# Patient Record
Sex: FEMALE | Race: WHITE
[De-identification: names, ages, dates, MRNs, and addresses within clinical notes are randomized per-mention and may not be internally consistent; named-entity substitution may affect disease eponyms.]

---

## 2022-08-30 ENCOUNTER — HOSPITAL ENCOUNTER (OUTPATIENT)
Dept: HOSPITAL 33 - ED | Age: 62
Setting detail: OBSERVATION
Discharge: HOME | End: 2022-08-30
Attending: INTERNAL MEDICINE | Admitting: INTERNAL MEDICINE
Payer: MEDICARE

## 2022-08-30 VITALS — DIASTOLIC BLOOD PRESSURE: 75 MMHG | OXYGEN SATURATION: 91 % | SYSTOLIC BLOOD PRESSURE: 122 MMHG | HEART RATE: 113 BPM

## 2022-08-30 DIAGNOSIS — S00.83XA: ICD-10-CM

## 2022-08-30 DIAGNOSIS — S00.432A: ICD-10-CM

## 2022-08-30 DIAGNOSIS — S05.12XA: ICD-10-CM

## 2022-08-30 DIAGNOSIS — Z20.828: ICD-10-CM

## 2022-08-30 DIAGNOSIS — Z79.899: ICD-10-CM

## 2022-08-30 DIAGNOSIS — I48.91: ICD-10-CM

## 2022-08-30 DIAGNOSIS — I50.9: ICD-10-CM

## 2022-08-30 DIAGNOSIS — I21.4: Primary | ICD-10-CM

## 2022-08-30 DIAGNOSIS — N39.0: ICD-10-CM

## 2022-08-30 DIAGNOSIS — S40.022A: ICD-10-CM

## 2022-08-30 DIAGNOSIS — Z85.3: ICD-10-CM

## 2022-08-30 DIAGNOSIS — T74.91XA: ICD-10-CM

## 2022-08-30 LAB
ALBUMIN SERPL-MCNC: 3.9 G/DL (ref 3.5–5)
ALP SERPL-CCNC: 104 U/L (ref 38–126)
ALT SERPL-CCNC: 24 U/L (ref 0–35)
AMPHETAMINES UR QL: NEGATIVE
AMYLASE SERPL-CCNC: 67 U/L (ref 30–110)
ANION GAP SERPL CALC-SCNC: 16.2 MEQ/L (ref 5–15)
ANION GAP SERPL CALC-SCNC: 9.7 MEQ/L (ref 5–15)
AST SERPL QL: 31 U/L (ref 14–36)
BARBITURATES UR QL: NEGATIVE
BASOPHILS # BLD AUTO: 0.09 X10^3/UL (ref 0–0.4)
BENZODIAZ UR QL SCN: NEGATIVE
BILIRUB BLD-MCNC: 0.7 MG/DL (ref 0.2–1.3)
BNP SERPL-MCNC: 3800 PG/ML (ref 0–900)
BUN SERPL-MCNC: 17 MG/DL (ref 7–17)
BUN SERPL-MCNC: 19 MG/DL (ref 7–17)
CALCIUM SPEC-MCNC: 8.7 MG/DL (ref 8.4–10.2)
CALCIUM SPEC-MCNC: 8.8 MG/DL (ref 8.4–10.2)
CHLORIDE SERPL-SCNC: 102 MMOL/L (ref 98–107)
CHLORIDE SERPL-SCNC: 107 MMOL/L (ref 98–107)
CO2 SERPL-SCNC: 21 MMOL/L (ref 22–30)
CO2 SERPL-SCNC: 22 MMOL/L (ref 22–30)
COCAINE UR QL SCN: NEGATIVE
CREAT SERPL-MCNC: 0.75 MG/DL (ref 0.52–1.04)
CREAT SERPL-MCNC: 0.95 MG/DL (ref 0.52–1.04)
CRYSTALS UNIDENTIFIED: (no result) /HPF
EOSINOPHIL # BLD AUTO: 0.03 X10^3/UL (ref 0–0.5)
FLUAV AG NPH QL IA: NEGATIVE
FLUBV AG NPH QL IA: NEGATIVE
GFR SERPLBLD BASED ON 1.73 SQ M-ARVRAT: > 60 ML/MIN
GFR SERPLBLD BASED ON 1.73 SQ M-ARVRAT: > 60 ML/MIN
GLUCOSE SERPL-MCNC: 131 MG/DL (ref 74–106)
GLUCOSE SERPL-MCNC: 178 MG/DL (ref 74–106)
GLUCOSE UR-MCNC: NEGATIVE MG/DL
HCT VFR BLD AUTO: 38.7 % (ref 35–47)
HCT VFR BLD AUTO: 39.3 % (ref 35–47)
HGB BLD-MCNC: 12.7 G/DL (ref 12–16)
HGB BLD-MCNC: 13.1 G/DL (ref 12–16)
LIPASE SERPL-CCNC: 36 U/L (ref 23–300)
LYMPHOCYTES # SPEC AUTO: 1.34 X10^3/UL (ref 1–4.6)
MCH RBC QN AUTO: 27.3 PG (ref 26–32)
MCH RBC QN AUTO: 27.5 PG (ref 26–32)
MCHC RBC AUTO-ENTMCNC: 32.8 G/DL (ref 32–36)
MCHC RBC AUTO-ENTMCNC: 33.3 G/DL (ref 32–36)
METHADONE UR QL: NEGATIVE
MONOCYTES # BLD AUTO: 0.57 X10^3/UL (ref 0–1.3)
OPIATES UR QL: NEGATIVE
PCP UR QL CFM>20 NG/ML: NEGATIVE
PLATELET # BLD AUTO: 262 X10^3/UL (ref 150–450)
PLATELET # BLD AUTO: 284 X10^3/UL (ref 150–450)
POTASSIUM SERPLBLD-SCNC: 3.5 MMOL/L (ref 3.5–5.1)
POTASSIUM SERPLBLD-SCNC: 3.8 MMOL/L (ref 3.5–5.1)
PROT SERPL-MCNC: 6.6 G/DL (ref 6.3–8.2)
PROT UR STRIP-MCNC: 30 MG/DL
RBC # BLD AUTO: 4.62 X10^6/UL (ref 4.1–5.4)
RBC # BLD AUTO: 4.8 X10^6/UL (ref 4.1–5.4)
RBC # UR AUTO: (no result) ERY/UL (ref 0–5)
RSV AG SPEC QL IA: NEGATIVE
SARS-COV-2 AG RESP QL IA.RAPID: NEGATIVE
SODIUM SERPL-SCNC: 135 MMOL/L (ref 137–145)
SODIUM SERPL-SCNC: 136 MMOL/L (ref 137–145)
THC UR QL SCN: POSITIVE
TROPONIN T SERPL HS-MCNC: 0.05 NG/ML (ref 0–0.03)
UA DIPSTICK PNL UR: (no result)
URINE CULTURED INDICATED?: YES
WBC # BLD AUTO: 14.2 X10^3/UL (ref 4–10.5)
WBC # BLD AUTO: 16.3 X10^3/UL (ref 4–10.5)
WBC #/AREA URNS HPF: (no result) /HPF (ref 0–5)

## 2022-08-30 PROCEDURE — 96365 THER/PROPH/DIAG IV INF INIT: CPT

## 2022-08-30 PROCEDURE — 80053 COMPREHEN METABOLIC PANEL: CPT

## 2022-08-30 PROCEDURE — 83880 ASSAY OF NATRIURETIC PEPTIDE: CPT

## 2022-08-30 PROCEDURE — G0378 HOSPITAL OBSERVATION PER HR: HCPCS

## 2022-08-30 PROCEDURE — 84484 ASSAY OF TROPONIN QUANT: CPT

## 2022-08-30 PROCEDURE — 85027 COMPLETE CBC AUTOMATED: CPT

## 2022-08-30 PROCEDURE — 74177 CT ABD & PELVIS W/CONTRAST: CPT

## 2022-08-30 PROCEDURE — 71260 CT THORAX DX C+: CPT

## 2022-08-30 PROCEDURE — 96376 TX/PRO/DX INJ SAME DRUG ADON: CPT

## 2022-08-30 PROCEDURE — 80048 BASIC METABOLIC PNL TOTAL CA: CPT

## 2022-08-30 PROCEDURE — 96375 TX/PRO/DX INJ NEW DRUG ADDON: CPT

## 2022-08-30 PROCEDURE — 99292 CRITICAL CARE ADDL 30 MIN: CPT

## 2022-08-30 PROCEDURE — 85730 THROMBOPLASTIN TIME PARTIAL: CPT

## 2022-08-30 PROCEDURE — 0241U: CPT

## 2022-08-30 PROCEDURE — 81015 MICROSCOPIC EXAM OF URINE: CPT

## 2022-08-30 PROCEDURE — 83690 ASSAY OF LIPASE: CPT

## 2022-08-30 PROCEDURE — 87086 URINE CULTURE/COLONY COUNT: CPT

## 2022-08-30 PROCEDURE — 85025 COMPLETE CBC W/AUTO DIFF WBC: CPT

## 2022-08-30 PROCEDURE — 73130 X-RAY EXAM OF HAND: CPT

## 2022-08-30 PROCEDURE — 82150 ASSAY OF AMYLASE: CPT

## 2022-08-30 PROCEDURE — 96367 TX/PROPH/DG ADDL SEQ IV INF: CPT

## 2022-08-30 PROCEDURE — 93268 ECG RECORD/REVIEW: CPT

## 2022-08-30 PROCEDURE — 73060 X-RAY EXAM OF HUMERUS: CPT

## 2022-08-30 PROCEDURE — 99285 EMERGENCY DEPT VISIT HI MDM: CPT

## 2022-08-30 PROCEDURE — 70486 CT MAXILLOFACIAL W/O DYE: CPT

## 2022-08-30 PROCEDURE — 96374 THER/PROPH/DIAG INJ IV PUSH: CPT

## 2022-08-30 PROCEDURE — 36415 COLL VENOUS BLD VENIPUNCTURE: CPT

## 2022-08-30 PROCEDURE — 99291 CRITICAL CARE FIRST HOUR: CPT

## 2022-08-30 PROCEDURE — 70450 CT HEAD/BRAIN W/O DYE: CPT

## 2022-08-30 PROCEDURE — 93005 ELECTROCARDIOGRAM TRACING: CPT

## 2022-08-30 PROCEDURE — G0480 DRUG TEST DEF 1-7 CLASSES: HCPCS

## 2022-08-30 PROCEDURE — 36000 PLACE NEEDLE IN VEIN: CPT

## 2022-08-30 PROCEDURE — 80307 DRUG TEST PRSMV CHEM ANLYZR: CPT

## 2022-08-30 PROCEDURE — 73080 X-RAY EXAM OF ELBOW: CPT

## 2022-08-30 PROCEDURE — 72125 CT NECK SPINE W/O DYE: CPT

## 2022-08-30 RX ADMIN — HEPARIN SODIUM ONE UNIT: 10000 INJECTION, SOLUTION INTRAVENOUS; SUBCUTANEOUS at 15:17

## 2022-08-30 RX ADMIN — HEPARIN SODIUM ONE UNIT: 10000 INJECTION, SOLUTION INTRAVENOUS; SUBCUTANEOUS at 15:14
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
## 2022-08-31 NOTE — PCM.DS
Discharge Summary


Date of Admission: 


08/30/22 18:10





Admitting Physician: 


DARCI COLLINS





Consults: 





                                Consults on Case





08/30/22 17:33


Nutritional Consult ROUTINE 











Primary Care Provider: 


JOSE MCKNIGHT








Allergies


Allergies





No Known Drug Allergies Allergy (Verified 08/30/22 18:20)


   











Hospital Summary





- Hospital Course


Hospital Course: 








                                 Chief Complaint





Diagnosis                        CHF





                                    Allergies











Allergy/AdvReac Type Severity Reaction Status Date / Time


 


No Known Drug Allergies Allergy   Verified 08/30/22 18:20








                           Vital Signs (Last 24 hours)











  Temp Pulse Resp BP BP Pulse Ox


 


 08/30/22 22:00  98.4 F  113 H  22   122/75  91 L


 


 08/30/22 21:00   120 H  26 H   124/84  92 L


 


 08/30/22 20:00  98.3 F  122 H  26 H  97/74  97/74  94 L


 


 08/30/22 19:00   130 H  26 H  122/74  


 


 08/30/22 18:30   128 H  28 H  114/74  


 


 08/30/22 18:26  98.6 F  146 H  26 H   105/80  94 L


 


 08/30/22 17:40       91 L


 


 08/30/22 17:00   126 H  24   113/81  95


 


 08/30/22 16:00   99 H    120/89  93 L


 


 08/30/22 15:00   95 H  14   125/82  93 L


 


 08/30/22 14:00   129 H  18   105/73  94 L


 


 08/30/22 13:01   113 H  26 H   122/94  92 L


 


 08/30/22 12:06   113 H  21   115/81  92 L


 


 08/30/22 12:03   103 H  22  122/94  


 


 08/30/22 11:03   141 H  25 H  127/99  


 


 08/30/22 10:03  97.7 F     


 


 08/30/22 09:51    30 H    91 L


 


 08/30/22 09:48   124 H  28 H   137/116  91 L








                                Home Medications











 Medication  Instructions  Recorded  Confirmed  Last Taken  Type


 


Amlodipine Besylate 10 mg PO QHS 08/30/22 08/30/22 08/29/22 22:00 History


 


Atorvastatin Calcium 20 mg PO QHS 08/30/22 08/30/22 08/29/22 22:00 History


 


Levothyroxine Sodium 50 Mcg*** 50 mg PO QHS 08/30/22 08/30/22 08/29/22 22:00 

History





[Synthroid 50 Mcg***]     


 


Lisinopril/Hydrochlorothiazide 1 tab PO QHS 08/30/22 08/30/22 08/29/22 22:00 

History





[Lisinopril-Hctz 10-12.5 mg Tab]     








                               Current Medications














Discontinued Medications














Generic Name Dose Route Start Last Admin





  Trade Name Freq  PRN Reason Stop Dose Admin


 


Aspirin  324 mg  08/30/22 16:26  08/30/22 16:34





  Aspirin 81 Mg Tab.Chew  PO  08/30/22 16:27  324 mg





  STAT ONE   Administration


 


Diltiazem HCl  15 mg  08/30/22 11:01  08/30/22 11:03





  Diltiazem Hcl Iv 5 Mg/Ml Vial  IV  08/30/22 11:02  15 mg





  STAT ONE   Administration


 


Diltiazem HCl  Confirm  08/30/22 11:01 





  Diltiazem Hcl Iv 5 Mg/Ml Vial  Administered  08/30/22 11:02 





  Dose  





  50 mg  





  IV  





  .STK-MED ONE  


 


Enoxaparin Sodium  40 mg  08/30/22 22:00 





  Enoxaparin Sodium*** 40 Mg/0.4 Ml Syringe  SQ  09/29/22 21:59 





  DAILY DANICA  


 


Fentanyl Citrate  50 mcg  08/30/22 10:27  08/30/22 10:32





  Fentanyl Citrate 100 Mcg/2 Ml* Vial  IV  08/30/22 10:28  50 mcg





  STAT ONE   Administration


 


Fentanyl Citrate  Confirm  08/30/22 10:31 





  Fentanyl Citrate 100 Mcg/2 Ml* Vial  Administered  08/30/22 10:32 





  Dose  





  100 mcg  





  .ROUTE  





  .STK-MED ONE  


 


Furosemide  40 mg  08/30/22 12:31  08/30/22 12:51





  Furosemide 40 Mg/4 Ml Vial  IV  08/30/22 12:32  40 mg





  STAT ONE   Administration


 


Furosemide  Confirm  08/30/22 12:51 





  Furosemide 40 Mg/4 Ml Vial  Administered  08/30/22 12:52 





  Dose  





  40 mg  





  .ROUTE  





  .STK-MED ONE  


 


Furosemide  40 mg  08/31/22 10:00 





  Furosemide 40 Mg/4 Ml Vial  IV  09/30/22 09:59 





  BID DIURETIC DANICA  


 


Heparin Sodium (Beef Lung)  5,000 unit  08/30/22 15:11  08/30/22 15:17





  Heparin 5000 Unit/0.5 Ml Syringe  IV  08/30/22 15:12  5,000 unit





  STAT ONE   Administration


 


Heparin Sodium (Beef Lung)  Confirm  08/30/22 15:13 





  Heparin 5000 Unit/0.5 Ml Syringe  Administered  08/30/22 15:14 





  Dose  





  5,000 unit  





  .ROUTE  





  .STK-MED ONE  


 


Diltiazem HCl  100 mls @ 5 mls/hr  08/30/22 11:01  08/30/22 20:00





  Cardizem Drip 100 Mg/100 Ml D5w  IV  09/29/22 11:00  10 mg/hr





  .Q20H PRN   10 mls/hr





  HEART RATE/ A-FIB   Titration





  Protocol  





  5 MG/HR  


 


Heparin Sodium/Dextrose  25,000 units in 250 mls @ 10 mls/hr  08/30/22 15:30  

08/30/22 20:53





  Heparin 25,000 Units/D5w 250ml Premix  IV  09/29/22 15:29  0 mls/hr





  .Q24H DANICA   0 mls/hr





    Infusion


 


Ceftriaxone Sodium/Dextrose  1 g in 50 mls @ 100 mls/hr  08/30/22 15:15  

08/30/22 15:52





  Rocephin 1 Gm-D5w 50 Ml Bag**  IV  08/30/22 15:44  Infused





  STAT STA   Infusion


 


Ceftriaxone Sodium/Dextrose  Confirm  08/30/22 15:18 





  Rocephin 1 Gm-D5w 50 Ml Bag**  Administered  08/30/22 15:19 





  Dose  





  1 g in 50 mls @ ud  





  IV  





  .STK-MED ONE  


 


Ceftriaxone Sodium/Dextrose  1 g in 50 mls @ 100 mls/hr  08/31/22 10:00 





  Rocephin 1 Gm-D5w 50 Ml Bag**  IV  09/03/22 09:59 





  Q24H10 DANICA  


 


Heparin Sodium/Dextrose  Confirm  08/30/22 15:14 





  Heparin 25,000 Units/D5w 250ml Premix  Administered  08/30/22 15:15 





  Dose  





  25,000 units in 250 mls @ ud  





  IV  





  .STK-MED ONE  


 


Diltiazem HCl  Confirm  08/30/22 11:01 





  Cardizem Drip 100 Mg/100 Ml D5w  Administered  08/30/22 11:02 





  Dose  





  100 mls @ ud  





  IV  





  .STK-MED ONE  


 


Diltiazem HCl  Confirm  08/30/22 22:50 





  Cardizem Drip 100 Mg/100 Ml D5w  Administered  08/30/22 22:51 





  Dose  





  100 mls @ ud  





  IV  





  .STK-MED ONE  


 


Ondansetron HCl  4 mg  08/30/22 10:27  08/30/22 10:31





  Ondansetron Hcl 4 Mg/2 Ml Vial  IV  08/30/22 10:28  4 mg





  STAT ONE   Administration


 


Ondansetron HCl  Confirm  08/30/22 10:31 





  Ondansetron Hcl 4 Mg/2 Ml Vial  Administered  08/30/22 10:32 





  Dose  





  4 mg  





  .ROUTE  





  .STK-MED ONE  








                         Intake & Output (Last 24 hours)











 08/28/22 08/29/22 08/30/22 08/31/22





 11:59 11:59 11:59 11:59


 


Output Total    1000


 


Balance    -1000


 


Weight   98 kg 94.2 kg








                      Microbiology Results (Last 24 hours)





08/30/22 10:43   Clean Catch Midstream   Urine Culture - Pending





                       Laboratory Results (Last 24 hours)











  08/30/22 08/30/22 08/30/22





  20:15 20:13 17:50


 


WBC   


 


RBC   


 


Hgb   


 


Hct   


 


MCV   


 


MCH   


 


MCHC   


 


RDW   


 


Plt Count   


 


MPV   


 


Gran %   


 


Immature Gran % (Auto)   


 


Nucleat RBC Rel Count   


 


Eos # (Auto)   


 


Immature Gran # (Auto)   


 


Absolute Lymphs (auto)   


 


Absolute Monos (auto)   


 


Absolute Nucleated RBC   


 


Lymphocytes %   


 


Monocytes %   


 


Eosinophils %   


 


Basophils %   


 


Absolute Granulocytes   


 


Basophils #   


 


APTT   32.1 


 


Sodium    135 L


 


Potassium    3.8


 


Chloride    102


 


Carbon Dioxide    21 L


 


Anion Gap    16.2 H


 


BUN    17


 


Creatinine    0.95


 


Estimated GFR    > 60.0


 


Glucose    178 H


 


Calcium    8.7


 


Total Bilirubin   


 


AST   


 


ALT   


 


Alkaline Phosphatase   


 


Troponin I  0.049 H*   0.049 H*


 


NT-Pro-B Natriuret Pep    3800 H


 


Serum Total Protein   


 


Albumin   


 


Amylase   


 


Lipase   


 


Urinalys Dipstick Clnc   


 


Urine Color   


 


Urine Appearance   


 


Urine pH   


 


Ur Specific Gravity   


 


POC Urine Protein Conf   


 


Urine Ketones   


 


Urine Nitrite   


 


Urine Bilirubin   


 


Urine Urobilinogen   


 


Urine Leukocytes   


 


Urine WBC (Auto)   


 


Urine RBC (Auto)   


 


U Epithel Cells (Auto)   


 


Urine Bacteria (Auto)   


 


Urine RBC   


 


Unidentified Crystals   


 


Urine Mucus (Auto)   


 


Ur Yeast w Hyphae   


 


Urine Yeast (Budding)   


 


Ur Culture Indicated?   


 


Urine Glucose   


 


Urine Opiates Level   


 


Ur Methadone   


 


Urine Barbiturates   


 


Ur Phencyclidine (PCP)   


 


Urine Amphetamine   


 


U Benzodiazepine Level   


 


Urine Cocaine   


 


Urine Marijuana (THC)   


 


Ethyl Alcohol   


 


Influenza Type A Ag   


 


Influenza Type B Ag   


 


RSV (PCR)   


 


SARS-CoV-2 (PCR)   














  08/30/22 08/30/22 08/30/22





  17:50 14:30 12:30


 


WBC  16.3 H  


 


RBC  4.62  


 


Hgb  12.7  


 


Hct  38.7  


 


MCV  83.8  


 


MCH  27.5  


 


MCHC  32.8  


 


RDW  14.4 H  


 


Plt Count  284  


 


MPV  10.6  


 


Gran %   


 


Immature Gran % (Auto)   


 


Nucleat RBC Rel Count   


 


Eos # (Auto)   


 


Immature Gran # (Auto)   


 


Absolute Lymphs (auto)   


 


Absolute Monos (auto)   


 


Absolute Nucleated RBC   


 


Lymphocytes %   


 


Monocytes %   


 


Eosinophils %   


 


Basophils %   


 


Absolute Granulocytes   


 


Basophils #   


 


APTT   


 


Sodium   


 


Potassium   


 


Chloride   


 


Carbon Dioxide   


 


Anion Gap   


 


BUN   


 


Creatinine   


 


Estimated GFR   


 


Glucose   


 


Calcium   


 


Total Bilirubin   


 


AST   


 


ALT   


 


Alkaline Phosphatase   


 


Troponin I   0.045 H* 


 


NT-Pro-B Natriuret Pep   


 


Serum Total Protein   


 


Albumin   


 


Amylase   


 


Lipase   


 


Urinalys Dipstick Clnc   


 


Urine Color   


 


Urine Appearance   


 


Urine pH   


 


Ur Specific Gravity   


 


POC Urine Protein Conf   


 


Urine Ketones   


 


Urine Nitrite   


 


Urine Bilirubin   


 


Urine Urobilinogen   


 


Urine Leukocytes   


 


Urine WBC (Auto)   


 


Urine RBC (Auto)   


 


U Epithel Cells (Auto)   


 


Urine Bacteria (Auto)   


 


Urine RBC   


 


Unidentified Crystals   


 


Urine Mucus (Auto)   


 


Ur Yeast w Hyphae   


 


Urine Yeast (Budding)   


 


Ur Culture Indicated?   


 


Urine Glucose   


 


Urine Opiates Level   


 


Ur Methadone   


 


Urine Barbiturates   


 


Ur Phencyclidine (PCP)   


 


Urine Amphetamine   


 


U Benzodiazepine Level   


 


Urine Cocaine   


 


Urine Marijuana (THC)   


 


Ethyl Alcohol   


 


Influenza Type A Ag    NEGATIVE


 


Influenza Type B Ag    NEGATIVE


 


RSV (PCR)    NEGATIVE


 


SARS-CoV-2 (PCR)    NEGATIVE














  08/30/22 08/30/22 08/30/22





  10:43 10:43 10:37


 


WBC   


 


RBC   


 


Hgb   


 


Hct   


 


MCV   


 


MCH   


 


MCHC   


 


RDW   


 


Plt Count   


 


MPV   


 


Gran %   


 


Immature Gran % (Auto)   


 


Nucleat RBC Rel Count   


 


Eos # (Auto)   


 


Immature Gran # (Auto)   


 


Absolute Lymphs (auto)   


 


Absolute Monos (auto)   


 


Absolute Nucleated RBC   


 


Lymphocytes %   


 


Monocytes %   


 


Eosinophils %   


 


Basophils %   


 


Absolute Granulocytes   


 


Basophils #   


 


APTT   


 


Sodium   


 


Potassium   


 


Chloride   


 


Carbon Dioxide   


 


Anion Gap   


 


BUN   


 


Creatinine   


 


Estimated GFR   


 


Glucose   


 


Calcium   


 


Total Bilirubin   


 


AST   


 


ALT   


 


Alkaline Phosphatase   


 


Troponin I   


 


NT-Pro-B Natriuret Pep   


 


Serum Total Protein   


 


Albumin   


 


Amylase   


 


Lipase   


 


Urinalys Dipstick Clnc  MAIN LAB  


 


Urine Color  YELLOW  


 


Urine Appearance  SLIGHTLY CLOUDY  


 


Urine pH  6.0  


 


Ur Specific Gravity  1.025  


 


POC Urine Protein Conf  30  


 


Urine Ketones  NEGATIVE  


 


Urine Nitrite  NEGATIVE  


 


Urine Bilirubin  SMALL  


 


Urine Urobilinogen  0.2  


 


Urine Leukocytes  LARGE  


 


Urine WBC (Auto)    


 


Urine RBC (Auto)  16-25  


 


U Epithel Cells (Auto)  MODERATE  


 


Urine Bacteria (Auto)  RARE  


 


Urine RBC  TRACE-INTACT  


 


Unidentified Crystals  2-5  


 


Urine Mucus (Auto)  MANY  


 


Ur Yeast w Hyphae  Occasional  


 


Urine Yeast (Budding)  Few  


 


Ur Culture Indicated?  YES  


 


Urine Glucose  NEGATIVE  


 


Urine Opiates Level   NEGATIVE 


 


Ur Methadone   NEGATIVE 


 


Urine Barbiturates   NEGATIVE 


 


Ur Phencyclidine (PCP)   NEGATIVE 


 


Urine Amphetamine   NEGATIVE 


 


U Benzodiazepine Level   NEGATIVE 


 


Urine Cocaine   NEGATIVE 


 


Urine Marijuana (THC)   POSITIVE 


 


Ethyl Alcohol    < 10


 


Influenza Type A Ag   


 


Influenza Type B Ag   


 


RSV (PCR)   


 


SARS-CoV-2 (PCR)   














  08/30/22 08/30/22 08/30/22





  10:37 10:37 10:15


 


WBC   14.2 H 


 


RBC   4.80 


 


Hgb   13.1 


 


Hct   39.3 


 


MCV   81.9 


 


MCH   27.3 


 


MCHC   33.3 


 


RDW   14.1 H 


 


Plt Count   262 


 


MPV   10.3 


 


Gran %   85.4 H 


 


Immature Gran % (Auto)   0.3 


 


Nucleat RBC Rel Count   0.0 


 


Eos # (Auto)   0.03 


 


Immature Gran # (Auto)   0.04 H 


 


Absolute Lymphs (auto)   1.34 


 


Absolute Monos (auto)   0.57 


 


Absolute Nucleated RBC   0.00 


 


Lymphocytes %   9.5 L 


 


Monocytes %   4.0 


 


Eosinophils %   0.2 


 


Basophils %   0.6 


 


Absolute Granulocytes   12.09 H 


 


Basophils #   0.09 


 


APTT   


 


Sodium  136 L  


 


Potassium  3.5  


 


Chloride  107  


 


Carbon Dioxide  22  


 


Anion Gap  9.7  


 


BUN  19 H  


 


Creatinine  0.75  


 


Estimated GFR  > 60.0  


 


Glucose  131 H  


 


Calcium  8.8  


 


Total Bilirubin  0.70  


 


AST  31  


 


ALT  24  


 


Alkaline Phosphatase  104  


 


Troponin I   


 


NT-Pro-B Natriuret Pep    4130 H


 


Serum Total Protein  6.6  


 


Albumin  3.9  


 


Amylase  67  


 


Lipase  36  


 


Urinalys Dipstick Clnc   


 


Urine Color   


 


Urine Appearance   


 


Urine pH   


 


Ur Specific Gravity   


 


POC Urine Protein Conf   


 


Urine Ketones   


 


Urine Nitrite   


 


Urine Bilirubin   


 


Urine Urobilinogen   


 


Urine Leukocytes   


 


Urine WBC (Auto)   


 


Urine RBC (Auto)   


 


U Epithel Cells (Auto)   


 


Urine Bacteria (Auto)   


 


Urine RBC   


 


Unidentified Crystals   


 


Urine Mucus (Auto)   


 


Ur Yeast w Hyphae   


 


Urine Yeast (Budding)   


 


Ur Culture Indicated?   


 


Urine Glucose   


 


Urine Opiates Level   


 


Ur Methadone   


 


Urine Barbiturates   


 


Ur Phencyclidine (PCP)   


 


Urine Amphetamine   


 


U Benzodiazepine Level   


 


Urine Cocaine   


 


Urine Marijuana (THC)   


 


Ethyl Alcohol   


 


Influenza Type A Ag   


 


Influenza Type B Ag   


 


RSV (PCR)   


 


SARS-CoV-2 (PCR)   














  08/30/22





  10:15


 


WBC 


 


RBC 


 


Hgb 


 


Hct 


 


MCV 


 


MCH 


 


MCHC 


 


RDW 


 


Plt Count 


 


MPV 


 


Gran % 


 


Immature Gran % (Auto) 


 


Nucleat RBC Rel Count 


 


Eos # (Auto) 


 


Immature Gran # (Auto) 


 


Absolute Lymphs (auto) 


 


Absolute Monos (auto) 


 


Absolute Nucleated RBC 


 


Lymphocytes % 


 


Monocytes % 


 


Eosinophils % 


 


Basophils % 


 


Absolute Granulocytes 


 


Basophils # 


 


APTT 


 


Sodium 


 


Potassium 


 


Chloride 


 


Carbon Dioxide 


 


Anion Gap 


 


BUN 


 


Creatinine 


 


Estimated GFR 


 


Glucose 


 


Calcium 


 


Total Bilirubin 


 


AST 


 


ALT 


 


Alkaline Phosphatase 


 


Troponin I  0.036 H*


 


NT-Pro-B Natriuret Pep 


 


Serum Total Protein 


 


Albumin 


 


Amylase 


 


Lipase 


 


Urinalys Dipstick Clnc 


 


Urine Color 


 


Urine Appearance 


 


Urine pH 


 


Ur Specific Gravity 


 


POC Urine Protein Conf 


 


Urine Ketones 


 


Urine Nitrite 


 


Urine Bilirubin 


 


Urine Urobilinogen 


 


Urine Leukocytes 


 


Urine WBC (Auto) 


 


Urine RBC (Auto) 


 


U Epithel Cells (Auto) 


 


Urine Bacteria (Auto) 


 


Urine RBC 


 


Unidentified Crystals 


 


Urine Mucus (Auto) 


 


Ur Yeast w Hyphae 


 


Urine Yeast (Budding) 


 


Ur Culture Indicated? 


 


Urine Glucose 


 


Urine Opiates Level 


 


Ur Methadone 


 


Urine Barbiturates 


 


Ur Phencyclidine (PCP) 


 


Urine Amphetamine 


 


U Benzodiazepine Level 


 


Urine Cocaine 


 


Urine Marijuana (THC) 


 


Ethyl Alcohol 


 


Influenza Type A Ag 


 


Influenza Type B Ag 


 


RSV (PCR) 


 


SARS-CoV-2 (PCR) 








                             Orders (Last 24 hours)











 Category Date Time Status


 


 Bedrest AS TOLERATED Activity  08/30/22 17:35 Completed


 


 Implement CHF Pathway ROUTINE Care  08/30/22 17:33 Completed


 


 Place in Observation ROUTINE Care  08/30/22 17:33 Completed


 


 Vital Signs .q15mx2,q3omx2,q1hx2,q9kf82w Care  08/30/22 17:33 Completed


 


 Weight,Daily 0600 Care  08/30/22 17:33 Completed


 


 /Discharge Plan ROUTINE Cons  08/31/22 10:00 Completed


 


 Nutritional Consult ROUTINE Diet  08/30/22 17:33 Completed


 


 ABDOMEN AND PELVIS W CONTRAST [CT] Stat Exams  08/30/22 10:25 Completed


 


 CERVICAL SPINE WO CONTRAST [CT] Stat Exams  08/30/22 10:25 Completed


 


 CHEST WITH CONTRAST [CT] Stat Exams  08/30/22 10:25 Completed


 


 ELBOW (MINIMUM 3 VIEWS) Stat Exams  08/30/22 12:30 Taken


 


 FACIAL BONES WO CONTRAST [CT] Stat Exams  08/30/22 10:25 Completed


 


 HAND (MINIMUM 3 VIEWS) Stat Exams  08/30/22 12:30 Taken


 


 HEAD WITHOUT CONTRAST [CT] Stat Exams  08/30/22 10:25 Completed


 


 HUMERUS Stat Exams  08/30/22 12:30 Taken


 


 AMYLASE Stat Lab  08/30/22 10:37 Completed


 


 Alcohol [ETHYL ALCOHOL] Stat Lab  08/30/22 10:37 Completed


 


 BMP Stat Lab  08/30/22 17:50 Completed


 


 CBC Stat Lab  08/30/22 17:50 Completed


 


 CBC W DIFF Stat Lab  08/30/22 10:37 Completed


 


 CMP Stat Lab  08/30/22 10:37 Completed


 


 COVID/FLU/RSV Panel Stat Lab  08/30/22 12:30 Completed


 


 CULTURE,URINE Stat Lab  08/30/22 10:43 Received


 


 LIPASE Stat Lab  08/30/22 10:37 Completed


 


 NT PRO BNP Stat Lab  08/30/22 10:15 Completed


 


 NT PRO BNP Stat Lab  08/30/22 17:50 Completed


 


 PTT Stat Lab  08/30/22 20:13 Completed


 


 TROPONIN Q4H Lab  08/30/22 10:15 Completed


 


 TROPONIN Q4H Lab  08/30/22 14:30 Completed


 


 TROPONIN Q4H Lab  08/30/22 20:15 Completed


 


 TROPONIN Stat Lab  08/30/22 17:50 Completed


 


 UA W/RFX CULTURE Stat Lab  08/30/22 10:43 Completed


 


 Urine Triage Profile Stat Lab  08/30/22 10:43 Completed


 


 Aspirin 81 gm Chew*** [Baby Aspirin 81 mg Chew***] Med  08/30/22 16:26 

Discontinued





 324 mg PO STAT ONE   


 


 Ceftriaxone 1 GM/50 ML PREMIX* [ROCEPHIN 1 Gm-D5w 50 ml Med  08/31/22 10:00 

Discontinued





 Bag**]   





 1 g in 50 ml IV Q24H10   


 


 Ceftriaxone 1 GM/50 ML PREMIX* [ROCEPHIN 1 Gm-D5w 50 ml Med  08/30/22 15:15 

Discontinued





 Bag**]   





 1 g in 50 ml IV STAT   


 


 Ceftriaxone 1 GM/50 ML PREMIX* [ROCEPHIN 1 Gm-D5w 50 ml Med  08/30/22 15:18 

Discontinued





 Bag**]   





 1 g in 50 ml IV UD   


 


 Diltiazem HCl 100 mg/100 ml [Cardizem Drip 100 mg/100 Med  08/30/22 11:01 

Discontinued





 ml D5w] 100 ml   





 IV 5 mg/hr   


 


 Diltiazem HCl 100 mg/100 ml [Cardizem Drip 100 mg/100 Med  08/30/22 11:01 

Discontinued





 ml D5w] 100 ml   





 IV UD   


 


 Diltiazem HCl 100 mg/100 ml [Cardizem Drip 100 mg/100 Med  08/30/22 22:50 

Discontinued





 ml D5w] 100 ml   





 IV UD   


 


 Diltiazem HCl 50 mg/10 ml*** [Cardizem IV 50 MG/10 ML** Med  08/30/22 11:01 

Discontinued





 *]   





 15 mg IV STAT ONE   


 


 Diltiazem HCl 50 mg/10 ml*** [Cardizem IV 50 MG/10 ML** Med  08/30/22 11:01 

Discontinued





 *]   





 50 mg IV .STK-MED ONE   


 


 Enoxaparin Sodium*** [Enoxaparin Sodium] Med  08/30/22 22:00 Discontinued





 40 mg SQ DAILY   


 


 Fentanyl Citrate 100 Mcg/2 ml* [Sublimaze 100 Mcg/2 ml* Med  08/30/22 10:31 

Discontinued





 **]   





 100 mcg .ROUTE .STK-MED ONE   


 


 Fentanyl Citrate 100 Mcg/2 ml* [Sublimaze 100 Mcg/2 ml* Med  08/30/22 10:27 

Discontinued





 **]   





 50 mcg IV STAT ONE   


 


 Furosemide 40 mg/4 ml*** [Lasix 40 MG/4 ML***] Med  08/30/22 12:51 Discontinued





 40 mg .ROUTE .STK-MED ONE   


 


 Furosemide 40 mg/4 ml*** [Lasix 40 MG/4 ML***] Med  08/31/22 10:00 Discontinued





 40 mg IV BID DIURETIC   


 


 Furosemide 40 mg/4 ml*** [Lasix 40 MG/4 ML***] Med  08/30/22 12:31 Discontinued





 40 mg IV STAT ONE   


 


 Heparin 25,000 units/D5W 250ml [Heparin 25,000 units/ Med  08/30/22 15:30 

Discontinued





 D5W 250ML PREMIX]   





 25,000 units in 250 ml IV 10 mls/hr   


 


 Heparin 25,000 units/D5W 250ml [Heparin 25,000 units/ Med  08/30/22 15:14 

Discontinued





 D5W 250ML PREMIX]   





 25,000 units in 250 ml IV UD   


 


 Heparin 5000 Units/0.5 ml*** [Heparin 5000 UNITS/0.5 ML Med  08/30/22 15:13 

Discontinued





 (HIGH RISK MED)***]   





 5,000 unit .ROUTE .STK-MED ONE   


 


 Heparin 5000 Units/0.5 ml*** [Heparin 5000 UNITS/0.5 ML Med  08/30/22 15:11 

Discontinued





 (HIGH RISK MED)***]   





 5,000 unit IV STAT ONE   


 


 Ondansetron HCl 4 mg/2 ml** [Zofran 4 MG/2 ML VIAL**] Med  08/30/22 10:31 

Discontinued





 4 mg .ROUTE .STK-MED ONE   


 


 Ondansetron HCl 4 mg/2 ml** [Zofran 4 MG/2 ML VIAL**] Med  08/30/22 10:27 

Discontinued





 4 mg IV STAT ONE   


 


 Oxygen NASAL CANNULA 2 lpm RT  08/30/22 17:33 Completed


 


 Pulse Oximetry OVERNIGHT RT  08/30/22 17:33 Completed














- Vitals & Intake/Output


Vital Signs: 





                                   Vital Signs











Temperature  98.4 F   08/30/22 22:00


 


Pulse Rate  113 H  08/30/22 22:00


 


Respiratory Rate  22   08/30/22 22:00


 


Blood Pressure  122/75   08/30/22 22:00


 


O2 Sat by Pulse Oximetry  91 L  08/30/22 22:00











Intake & Output: 





                                 Intake & Output











 08/28/22 08/29/22 08/30/22 08/31/22





 11:59 11:59 11:59 11:59


 


Output Total    1000


 


Balance    -1000


 


Weight   98 kg 94.2 kg














- Lab


Result Diagrams: 


                                 08/30/22 17:50





                                 08/30/22 17:50


Lab Results-Last 24 Hrs: 





                            Lab Results-Last 24 Hours











  08/30/22 08/30/22 08/30/22 Range/Units





  10:15 10:15 10:37 


 


WBC    14.2 H  (4.0-10.5)  x10^3/uL


 


RBC    4.80  (4.1-5.4)  x10^6/uL


 


Hgb    13.1  (12.0-16.0)  g/dL


 


Hct    39.3  (35-47)  %


 


MCV    81.9  ()  fL


 


MCH    27.3  (26-32)  pg


 


MCHC    33.3  (32-36)  g/dL


 


RDW    14.1 H  (11.5-14.0)  %


 


Plt Count    262  (150-450)  x10^3/uL


 


MPV    10.3  (7.5-11.0)  fL


 


Gran %    85.4 H  (36.0-66.0)  %


 


Immature Gran % (Auto)    0.3  (0.00-0.4)  %


 


Nucleat RBC Rel Count    0.0  (0.00-0.1)  %


 


Eos # (Auto)    0.03  (0-0.5)  x10^3/uL


 


Immature Gran # (Auto)    0.04 H  (0.00-0.03)  x10^3u/L


 


Absolute Lymphs (auto)    1.34  (1.0-4.6)  x10^3/uL


 


Absolute Monos (auto)    0.57  (0.0-1.3)  x10^3/uL


 


Absolute Nucleated RBC    0.00  (0.00-0.01)  x10^3u/L


 


Lymphocytes %    9.5 L  (24.0-44.0)  %


 


Monocytes %    4.0  (0.0-12.0)  %


 


Eosinophils %    0.2  (0.00-5.0)  %


 


Basophils %    0.6  (0.0-0.4)  %


 


Absolute Granulocytes    12.09 H  (1.4-6.9)  x10^3/uL


 


Basophils #    0.09  (0-0.4)  x10^3/uL


 


APTT     (25.1-36.5)  SECONDS


 


Sodium     (137-145)  mmol/L


 


Potassium     (3.5-5.1)  mmol/L


 


Chloride     ()  mmol/L


 


Carbon Dioxide     (22-30)  mmol/L


 


Anion Gap     (5-15)  MEQ/L


 


BUN     (7-17)  mg/dL


 


Creatinine     (0.52-1.04)  mg/dL


 


Estimated GFR     ML/MIN


 


Glucose     ()  mg/dL


 


Calcium     (8.4-10.2)  mg/dL


 


Total Bilirubin     (0.2-1.3)  mg/dL


 


AST     (14-36)  U/L


 


ALT     (0-35)  U/L


 


Alkaline Phosphatase     ()  U/L


 


Troponin I  0.036 H*    (0.000-0.034)  ng/mL


 


NT-Pro-B Natriuret Pep   4130 H   (0-900)  pg/mL


 


Serum Total Protein     (6.3-8.2)  g/dL


 


Albumin     (3.5-5.0)  g/dL


 


Amylase     ()  U/L


 


Lipase     ()  U/L


 


Urinalys Dipstick Clnc     


 


Urine Color     (YELLOW)  


 


Urine Appearance     (CLEAR)  


 


Urine pH     (5-6)  


 


Ur Specific Gravity     (1.005-1.025)  


 


POC Urine Protein Conf     (Negative)  


 


Urine Ketones     (NEGATIVE)  


 


Urine Nitrite     (NEGATIVE)  


 


Urine Bilirubin     (NEGATIVE)  


 


Urine Urobilinogen     (0-1)  mg/dL


 


Urine Leukocytes     (NEGATIVE)  


 


Urine WBC (Auto)     (0-5)  /HPF


 


Urine RBC (Auto)     (0-2)  /HPF


 


U Epithel Cells (Auto)     (FEW)  /HPF


 


Urine Bacteria (Auto)     (NEGATIVE)  /HPF


 


Urine RBC     (0-5)  Miguel/ul


 


Unidentified Crystals     (NEGATIVE)  /HPF


 


Urine Mucus (Auto)     (NEGATIVE)  /HPF


 


Ur Yeast w Hyphae     (NEGATIVE)  /HPF


 


Urine Yeast (Budding)     (NEGATIVE)  /HPF


 


Ur Culture Indicated?     


 


Urine Glucose     (NEGATIVE)  mg/dL


 


Urine Opiates Level     (NEGATIVE)  


 


Ur Methadone     (NEGATIVE)  


 


Urine Barbiturates     (NEGATIVE)  


 


Ur Phencyclidine (PCP)     (NEGATIVE)  


 


Urine Amphetamine     (NEGATIVE)  


 


U Benzodiazepine Level     (NEGATIVE)  


 


Urine Cocaine     (NEGATIVE)  


 


Urine Marijuana (THC)     (NEGATIVE)  


 


Ethyl Alcohol     (0-10)  mg/dL


 


Influenza Type A Ag     (NEGATIVE)  


 


Influenza Type B Ag     (NEGATIVE)  


 


RSV (PCR)     (Negative)  


 


SARS-CoV-2 (PCR)     (NEGATIVE)  














  08/30/22 08/30/22 08/30/22 Range/Units





  10:37 10:37 10:43 


 


WBC     (4.0-10.5)  x10^3/uL


 


RBC     (4.1-5.4)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